# Patient Record
Sex: FEMALE | Race: WHITE | URBAN - METROPOLITAN AREA
[De-identification: names, ages, dates, MRNs, and addresses within clinical notes are randomized per-mention and may not be internally consistent; named-entity substitution may affect disease eponyms.]

---

## 2020-03-09 ENCOUNTER — IMPORTED ENCOUNTER (OUTPATIENT)
Dept: URBAN - METROPOLITAN AREA CLINIC 50 | Facility: CLINIC | Age: 80
End: 2020-03-09

## 2020-03-12 ENCOUNTER — IMPORTED ENCOUNTER (OUTPATIENT)
Dept: URBAN - METROPOLITAN AREA CLINIC 50 | Facility: CLINIC | Age: 80
End: 2020-03-12

## 2020-03-17 ENCOUNTER — IMPORTED ENCOUNTER (OUTPATIENT)
Dept: URBAN - METROPOLITAN AREA CLINIC 50 | Facility: CLINIC | Age: 80
End: 2020-03-17

## 2020-03-18 ENCOUNTER — IMPORTED ENCOUNTER (OUTPATIENT)
Dept: URBAN - METROPOLITAN AREA CLINIC 50 | Facility: CLINIC | Age: 80
End: 2020-03-18

## 2020-06-18 ENCOUNTER — IMPORTED ENCOUNTER (OUTPATIENT)
Dept: URBAN - METROPOLITAN AREA CLINIC 50 | Facility: CLINIC | Age: 80
End: 2020-06-18

## 2020-06-18 NOTE — PATIENT DISCUSSION
"""Discussed with patient that their are findings in the [facility] that recomend patient to take glaucoma ""

## 2020-07-09 NOTE — PATIENT DISCUSSION
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 Patient is unsure how long this has been happening. It has been going on at least since December 2019 per patient.

## 2020-12-29 ENCOUNTER — IMPORTED ENCOUNTER (OUTPATIENT)
Dept: URBAN - METROPOLITAN AREA CLINIC 50 | Facility: CLINIC | Age: 80
End: 2020-12-29

## 2021-04-17 ASSESSMENT — VISUAL ACUITY
OS_OTHER: BCVA>20/50.
OD_BAT: 20/40
OD_SC: 20/30+2
OS_PH: 20/60-
OS_SC: 20/70-1
OD_OTHER: 20/40. 20/80-.
OD_CC: J1-
OS_CC: J1-
OS_BAT: BCVA>20/50
OS_SC: 20/60+
OD_SC: 20/30-2

## 2021-04-17 ASSESSMENT — TONOMETRY
OS_IOP_MMHG: 18
OS_IOP_MMHG: 14
OD_IOP_MMHG: 13
OD_IOP_MMHG: 12
OD_IOP_MMHG: 16
OS_IOP_MMHG: 14

## 2021-04-17 ASSESSMENT — PACHYMETRY
OS_CT_UM: 455
OS_CT_UM: 455
OD_CT_UM: 4.68
OS_CT_UM: 455
OD_CT_UM: 4.68
OD_CT_UM: 4.68
OS_CT_UM: 455
OD_CT_UM: 4.68

## 2022-03-21 ENCOUNTER — PREPPED CHART (OUTPATIENT)
Dept: URBAN - METROPOLITAN AREA CLINIC 50 | Facility: CLINIC | Age: 82
End: 2022-03-21